# Patient Record
Sex: FEMALE | Race: WHITE
[De-identification: names, ages, dates, MRNs, and addresses within clinical notes are randomized per-mention and may not be internally consistent; named-entity substitution may affect disease eponyms.]

---

## 2017-07-01 ENCOUNTER — HOSPITAL ENCOUNTER (EMERGENCY)
Dept: HOSPITAL 31 - C.ER | Age: 22
Discharge: HOME | End: 2017-07-01
Payer: COMMERCIAL

## 2017-07-01 VITALS
RESPIRATION RATE: 18 BRPM | TEMPERATURE: 99.1 F | HEART RATE: 104 BPM | DIASTOLIC BLOOD PRESSURE: 85 MMHG | SYSTOLIC BLOOD PRESSURE: 117 MMHG

## 2017-07-01 DIAGNOSIS — F41.9: Primary | ICD-10-CM

## 2017-07-01 NOTE — C.PDOC
History Of Present Illness


21 year old female presents to the ED with complaints of anxiety and feeling 

"hot" after having 1 gram of Yara the night before and another 1 gram the 

night prior to that. Patient states she has taken more of the substance in the 

last two days then she usually does. She notes a history of anxiety and has 

seen a psychiatrist but is not taking any medications. She denies any vomiting, 

diarrhea, or headache. 


Time Seen by Provider: 17 02:29


Chief Complaint (Nursing): Anxiety


History Per: Patient


History/Exam Limitations: no limitations


Onset/Duration Of Symptoms: Hrs


Current Symptoms Are (Timing): Still Present


Suicide/Self Injury Attempted (Context): Cut Wrists


Modifying Factor(s): Other (yara )


Associated Symptoms: Anxiety


Involuntary Hold By: None


Recent travel outside of the [place]: No





Past Medical History


Reviewed: Historical Data, Nursing Documentation, Vital Signs


Vital Signs: 


 Last Vital Signs











Temp  99.1 F   17 04:29


 


Pulse  104 H  17 04:29


 


Resp  18   17 04:29


 


BP  117/85   17 04:29


 


Pulse Ox  92 L  17 06:06














- Medical History


PMH: Anemia, Anxiety, Bipolar Disorder, Depression, Schizophrenia, Seizures


Surgical History:  (x1)





- [facility] Procedures








INDIVID PSYCHOTHERAP NEC (14)


OTHER GROUP THERAPY (14)








Family History: States: Unknown Family Hx





- Social History


Hx Tobacco Use: No


Hx Alcohol Use: No (DENIED)


Hx Substance Use: Yes (YARA, MARIJUANA)





- Immunization History


Hx Tetanus Toxoid Vaccination: No


Hx Influenza Vaccination: No


Hx Pneumococcal Vaccination: No





Review Of Systems


Constitutional: Negative for: Fever, Chills


Cardiovascular: Negative for: Chest Pain, Palpitations


Respiratory: Negative for: Cough, Shortness of Breath


Gastrointestinal: Negative for: Nausea, Vomiting, Abdominal Pain, Diarrhea


Psych: Positive for: Anxiety.  Negative for: Suicidal ideation





Physical Exam





- Physical Exam


Appears: Non-toxic, No Acute Distress, Other (Patient is anxious and tremulous 

on exam. )


Skin: Warm, Dry, No Rash


Head: Atraumatic, Normacephalic


Eye(s): bilateral: Normal Inspection, PERRL, EOMI


Oral Mucosa: Moist


Neck: Normal ROM, Supple


Chest: Symmetrical, No Deformity


Cardiovascular: Rhythm Regular, No Friction Rub, No Murmur


Respiratory: Normal Breath Sounds, No Rales, No Rhonchi, No Stridor, No Wheezing


Gastrointestinal/Abdominal: Soft, No Tenderness, No Distention, No Guarding, No 

Rebound


Back: No CVA Tenderness, No Vertebral Tenderness, No Paraspinal Tenderness


Extremity: Normal ROM, No Tenderness


Neurological/Psych: Oriented x3, Normal Speech, Normal Cranial Nerves, Normal 

Motor, Normal Sensation


Gait: Steady





ED Course And Treatment


O2 Sat by Pulse Oximetry: 98 (room air)


Pulse Ox Interpretation: Normal





Medical Decision Making


Medical Decision Making: 


Xanax given. On re-exam, the patient reports improvement of symptoms. Lungs are 

CTA, heart is RRR, abdomen is soft, non-tender and patient is tolerating PO 

well. Ambulatory in the ED with steady gait. Follow up with the medical doctor 

within 1-2 days. Return if worsened. 





Disposition





- Disposition


Referrals: 


[facility] [Outside]


Disposition: HOME/ ROUTINE


Disposition Time: 04:25


Condition: GOOD


Additional Instructions: 


Follow up with the medical doctor within 1-2 days. Return if worsened. 


Instructions:  Anxiety (ED)





- Clinical Impression


Clinical Impression: 


 Anxiety








- Scribe Statement


The provider has reviewed the documentation as recorded by the Scribe





Kalyani Gregory





All medical record entries made by the Scribe were at my direction and 

personally dictated by me. I have reviewed the chart and agree that the record 

accurately reflects my personal performance of the history, physical exam, 

medical decision making, and the department course for this patient. I have 

also personally directed, reviewed, and agree with the discharge instructions 

and disposition.

## 2017-07-03 VITALS — OXYGEN SATURATION: 98 %

## 2017-08-16 ENCOUNTER — HOSPITAL ENCOUNTER (EMERGENCY)
Dept: HOSPITAL 31 - C.ER | Age: 22
Discharge: HOME | End: 2017-08-16
Payer: MEDICAID

## 2017-08-16 VITALS — OXYGEN SATURATION: 99 %

## 2017-08-16 VITALS
HEART RATE: 94 BPM | SYSTOLIC BLOOD PRESSURE: 119 MMHG | RESPIRATION RATE: 18 BRPM | TEMPERATURE: 98.6 F | DIASTOLIC BLOOD PRESSURE: 78 MMHG

## 2017-08-16 DIAGNOSIS — N39.0: Primary | ICD-10-CM

## 2017-08-16 LAB
BILIRUB UR-MCNC: NEGATIVE MG/DL
GLUCOSE UR STRIP-MCNC: NORMAL MG/DL
KETONES UR STRIP-MCNC: NEGATIVE MG/DL
LEUKOCYTE ESTERASE UR-ACNC: (no result) LEU/UL
PH UR STRIP: 7 [PH] (ref 5–8)
PROT UR STRIP-MCNC: (no result) MG/DL
RBC # UR STRIP: NEGATIVE /UL
RBC #/AREA URNS HPF: 5 /HPF (ref 0–3)
SP GR UR STRIP: 1.02 (ref 1–1.03)
UROBILINOGEN UR-MCNC: NORMAL MG/DL (ref 0.2–1)
WBC #/AREA URNS HPF: 11 /HPF (ref 0–5)

## 2017-08-16 NOTE — C.PDOC
History Of Present Illness





21 year old female presents to the ED with complaints of right sided flank pain 

beginning yesterday. Patient is unable to describe pain but "feels like kidney 

pain." She has a history of UTIs in the past. Patient notes nausea but denies 

vomiting, dysuria, or urinary frequency. 


Time Seen by Provider: 17 15:15


Chief Complaint (Nursing): Back Pain


History Per: Patient


History/Exam Limitations: no limitations


Onset/Duration Of Symptoms: Days (symptoms began yesterday )


Current Symptoms Are (Timing): Still Present


Quality Of Discomfort: Unable To Describe


Previous Symptoms: None


Associated Symptoms: Other (nausea )


Recent travel outside of the [place]: No





Past Medical History


Reviewed: Historical Data, Nursing Documentation, Vital Signs


Vital Signs: 


 Last Vital Signs











Temp  98.8 F   17 15:05


 


Pulse  100 H  17 15:05


 


Resp  20   17 15:05


 


BP  130/89   17 15:05


 


Pulse Ox  99   17 15:50














- Medical History


PMH: Anemia, Anxiety, Bipolar Disorder, Depression, Schizophrenia, Seizures


Surgical History:  (x1)





- [facility] Procedures








INDIVID PSYCHOTHERAP NEC (14)


OTHER GROUP THERAPY (14)








Family History: States: Unknown Family Hx





- Social History


Hx Tobacco Use: No


Hx Alcohol Use: No (DENIED)


Hx Substance Use: Yes (KEISHA, MARIJUANA)





- Immunization History


Hx Tetanus Toxoid Vaccination: No


Hx Influenza Vaccination: No


Hx Pneumococcal Vaccination: No





Review Of Systems


Constitutional: Negative for: Fever, Chills


Cardiovascular: Negative for: Chest Pain


Respiratory: Negative for: Shortness of Breath


Gastrointestinal: Positive for: Nausea, Other (right flank pain ).  Negative for

: Vomiting, Abdominal Pain, Diarrhea


Genitourinary: Negative for: Dysuria, Frequency, Vaginal Discharge, Vaginal 

Bleeding


Musculoskeletal: Positive for: Back Pain


Neurological: Negative for: Weakness, Numbness, Headache, Dizziness





Physical Exam





- Physical Exam


Appears: Non-toxic, No Acute Distress


Skin: Warm, Dry


Head: Atraumatic, Normacephalic


Eye(s): bilateral: Normal Inspection, EOMI


Oral Mucosa: Moist


Neck: Normal ROM, Supple


Chest: Symmetrical, No Deformity


Cardiovascular: Rhythm Regular, No Murmur


Respiratory: Normal Breath Sounds, No Rales, No Rhonchi, No Wheezing


Gastrointestinal/Abdominal: Bowel Sounds, Soft, No Tenderness, No Distention, 

No Guarding, No Rebound


Back: Normal Inspection, CVA Tenderness (right sided ), No Paraspinal Tenderness


Extremity: Normal ROM, No Tenderness, No Deformity, No Swelling


Neurological/Psych: Oriented x3, Normal Speech


Gait: Steady





ED Course And Treatment


O2 Sat by Pulse Oximetry: 99 (room air )


Progress Note: A UA was ordered and patient was given Tylenol.





Medical Decision Making


Medical Decision Making: 


Patient with flank pain


UA and preg ordered. UA c/w UTI. Will send C/S


Treated with Tylenol and Cipro


Patient has no fever and is seated in chair in no distress. Advise patient to 

drink fluids and to complete antibiotics. Follow up with PCP. Patient asking 

for work note. 





Disposition


Counseled Patient/Family Regarding: Diagnosis, Need For Followup, Rx Given





- Disposition


Referrals: 


[facility] [Outside]


Disposition: HOME/ ROUTINE


Disposition Time: 15:50


Condition: STABLE


Additional Instructions: 


Take antibiotic twice daily and be sure to finish taking all of antibiotic. 

Drink plenty of water. If urine culture obtained, can take few days for results

, and will contact to notify if antibiotic is treating UTI well. 


Prescriptions: 


Ciprofloxacin [Cipro] 1 tab PO BID #10 tab


Phenazopyridine [Pyridium] 100 mg PO Q8 #6 tab


Instructions:  Urinary Tract Infection in Women (DC)


Forms:  [facility] (English), Work Excuse





- POA


Present On Arrival: None





- Clinical Impression


Clinical Impression: 


 UTI (urinary tract infection)








- PA / NP / Resident Statement


MD/DO has reviewed & agrees with the documentation as recorded.





- Scribe Statement


The provider has reviewed the documentation as recorded by the Scribthanh Gregory





All medical record entries made by the Amina were at my direction and 

personally dictated by me. I have reviewed the chart and agree that the record 

accurately reflects my personal performance of the history, physical exam, 

medical decision making, and the department course for this patient. I have 

also personally directed, reviewed, and agree with the discharge instructions 

and disposition.

## 2017-08-19 ENCOUNTER — HOSPITAL ENCOUNTER (EMERGENCY)
Dept: HOSPITAL 14 - H.ER | Age: 22
Discharge: HOME | End: 2017-08-19
Payer: COMMERCIAL

## 2017-08-19 VITALS
HEART RATE: 93 BPM | SYSTOLIC BLOOD PRESSURE: 133 MMHG | DIASTOLIC BLOOD PRESSURE: 82 MMHG | TEMPERATURE: 98.8 F | OXYGEN SATURATION: 100 % | RESPIRATION RATE: 20 BRPM

## 2017-08-19 DIAGNOSIS — F31.9: ICD-10-CM

## 2017-08-19 DIAGNOSIS — F20.9: ICD-10-CM

## 2017-08-19 DIAGNOSIS — N39.0: Primary | ICD-10-CM

## 2017-08-19 DIAGNOSIS — F41.9: ICD-10-CM

## 2017-08-19 LAB
BASOPHILS # BLD AUTO: 0.1 K/UL (ref 0–0.2)
BASOPHILS NFR BLD: 1 % (ref 0–2)
BUN SERPL-MCNC: 13 MG/DL (ref 7–17)
CALCIUM SERPL-MCNC: 9.2 MG/DL (ref 8.4–10.2)
CHLORIDE SERPL-SCNC: 106 MMOL/L (ref 98–107)
CO2 SERPL-SCNC: 23 MMOL/L (ref 22–30)
EOSINOPHIL # BLD AUTO: 0.1 K/UL (ref 0–0.7)
EOSINOPHIL NFR BLD: 1.1 % (ref 0–4)
ERYTHROCYTE [DISTWIDTH] IN BLOOD BY AUTOMATED COUNT: 13.4 % (ref 11.5–14.5)
GLUCOSE SERPL-MCNC: 90 MG/DL (ref 65–105)
HCT VFR BLD CALC: 36.8 % (ref 34–47)
LYMPHOCYTES # BLD AUTO: 1.7 K/UL (ref 1–4.3)
LYMPHOCYTES NFR BLD AUTO: 24.2 % (ref 20–40)
MCH RBC QN AUTO: 28.7 PG (ref 27–31)
MCHC RBC AUTO-ENTMCNC: 33.9 G/DL (ref 33–37)
MCV RBC AUTO: 84.8 FL (ref 81–99)
MONOCYTES # BLD: 0.7 K/UL (ref 0–0.8)
MONOCYTES NFR BLD: 9 % (ref 0–10)
NEUTROPHILS # BLD: 4.7 K/UL (ref 1.8–7)
NEUTROPHILS NFR BLD AUTO: 64.7 % (ref 50–75)
NRBC BLD AUTO-RTO: 0.1 % (ref 0–0)
PLATELET # BLD: 264 K/UL (ref 130–400)
PMV BLD AUTO: 7.4 FL (ref 7.2–11.7)
POTASSIUM SERPL-SCNC: 3.9 MMOL/L (ref 3.6–5)
SODIUM SERPL-SCNC: 140 MMOL/L (ref 132–148)
WBC # BLD AUTO: 7.2 K/UL (ref 4.8–10.8)

## 2017-08-19 PROCEDURE — 99284 EMERGENCY DEPT VISIT MOD MDM: CPT

## 2017-08-19 PROCEDURE — 87086 URINE CULTURE/COLONY COUNT: CPT

## 2017-08-19 PROCEDURE — 81025 URINE PREGNANCY TEST: CPT

## 2017-08-19 PROCEDURE — 87040 BLOOD CULTURE FOR BACTERIA: CPT

## 2017-08-19 PROCEDURE — 96375 TX/PRO/DX INJ NEW DRUG ADDON: CPT

## 2017-08-19 PROCEDURE — 85025 COMPLETE CBC W/AUTO DIFF WBC: CPT

## 2017-08-19 PROCEDURE — 80048 BASIC METABOLIC PNL TOTAL CA: CPT

## 2017-08-19 PROCEDURE — 74176 CT ABD & PELVIS W/O CONTRAST: CPT

## 2017-08-19 PROCEDURE — 96365 THER/PROPH/DIAG IV INF INIT: CPT

## 2017-08-19 NOTE — CT
EXAM:

  CT Abdomen and Pelvis Without Intravenous Contrast



EXAM DATE/TIME:

  8/19/2017 4:51 PM



CLINICAL HISTORY:

  21 years old, female; Pain; Abdominal pain; Additional info: Roght flank pain



TECHNIQUE:

  Axial computed tomography images of the abdomen and pelvis without 

intravenous contrast.  All CT scans at this facility use one or more dose 

reduction techniques, viz.: automated exposure control; ma/kV adjustment per 

patient size (including targeted exams where dose is matched to indication; 

i.e. head); or iterative reconstruction technique.

  Coronal and sagittal reformatted images were created and reviewed.



COMPARISON:

  No relevant prior studies available.



FINDINGS:

  LOWER THORAX:  No infiltrate seen in the lung bases.



 ABDOMEN:

  LIVER:  No acute abnormality of the liver identified.

  GALLBLADDER AND BILE DUCTS:  No CT evidence of acute cholecystitis.  No 

evidence of significant biliary ductal dilatation.

  PANCREAS:  No CT evidence of acute pancreatitis.

  SPLEEN:  No acute abnormality of the spleen identified.

  ADRENALS:  No acute abnormality of the adrenal glands identified.

  KIDNEYS AND URETERS:  Faint areas of hyperdensity seen in the kidneys 

bilaterally, at the corticomedullary junction regions, most likely due to 

artifact from concentrated urine versus faint medullary nephrocalcinosis.  No 

renal stones, hydronephrosis, or hydroureter seen.

  STOMACH AND BOWEL:  Retained stool noted throughout the colon.  Bowel is 

otherwise unremarkable in appearance.  No evidence of bowel obstruction.

  APPENDIX:  Normal appendix is not seen, however, there are no significant 

inflammatory changes visualized in the expected location of the appendix to 

suggest appendicitis. Recommend clinical correlation.



 PELVIS:

  BLADDER:  No acute abnormality of the bladder identified.

  REPRODUCTIVE:No acute abnormality of the reproductive organs is seen.  No 

acute abnormality of the uterus identified.  No evidence of large adnexal 

masses.



 ABDOMEN and PELVIS:

  INTRAPERITONEAL SPACE:  Small amount of free fluid in the cul-de-sac. This is 

most likely physiologic in nature. No evidence of free intraperitoneal air.

  BONES/JOINTS:  Mild scoliotic curvature of the spine, convex to the left.  No 

acute fractures or other acute bony abnormality visualized.

  SOFT TISSUES:  No acute abnormality of the visualized soft tissues is seen.

  VASCULATURE:  No evidence of abdominal aortic aneurysm. No evidence of 

periaortic hemorrhage.

  LYMPH NODES:  No evidence of diffuse lymphadenopathy.



IMPRESSION:     

- No evidence of significant acute process on this unenhanced exam. No definite 

cause for pain identified.

- See above for remaining findings.

## 2017-08-19 NOTE — ED PDOC
HPI: Abdomen


Time Seen by Provider: 17 16:36


Chief Complaint (Nursing): Back Pain


Chief Complaint (Provider): Right flank pain and suprapubic pain


History Per: Patient


History/Exam Limitations: no limitations


Onset/Duration Of Symptoms: Days (x3)


Associated Symptoms: Vomiting (x4)


Additional Complaint(s): 





Patient is a 21 year old female with a past medical history of a urinary tract 

infection, , depression, and substance abuse, presenting to the 

emergency department for constant right flank pain and suprapubic pain ongoing 

for 3 days with associated 4 episodes of emesis and chills today. Reports that 

she was at [facility] three days ago and was diagnosed with a UTI and 

given Cipro. Although compliant with medication, reports no relief of symptoms. 

Denies fever, diarrhea, hematuria, dysuria, or current drug use.





PCP: Dr. Carmine Antony





Past Medical History


Reviewed: Historical Data, Nursing Documentation, Vital Signs


Vital Signs: 


 Last Vital Signs











Temp  98.8 F   17 16:03


 


Pulse  93 H  17 16:03


 


Resp  20   17 16:03


 


BP  133/82   17 16:03


 


Pulse Ox  100   17 20:40














- Medical History


PMH: Anemia, Anxiety, Bipolar Disorder, Depression, Schizophrenia, Seizures


   Denies: HTN, Chronic Kidney Disease, Sexually Transmitted Disease


Other PMH: Urinary Tract Infection





- Surgical History


Surgical History:  (x1)





- Family History


Family History: States: Unknown Family Hx





- Social History


Current smoker - smoking cessation education provided: No


Ex-Smoker (has not smoked in the last 12 months): No


Alcohol: None


Drugs: Cannabis (Marijuana), Other (Clotilde)





- Immunization History


Hx Tetanus Toxoid Vaccination: No


Hx Influenza Vaccination: No


Hx Pneumococcal Vaccination: No





- Home Medications


Home Medications: 


 Ambulatory Orders











 Medication  Instructions  Recorded


 


Ciprofloxacin [Cipro] 1 tab PO BID #10 tab 17


 


Ondansetron ODT [Zofran ODT] 1 odt PO BID PRN #6 odt 17


 


Phenazopyridine [Pyridium] 100 mg PO Q8 #6 tab 17


 


Ondansetron ODT [Zofran ODT] 4 mg PO Q8 PRN #12 odt 17


 


Sulfamethoxazole/Trimethoprim 1 tab PO BID #20 tab 17





[Bactrim  mg-160 mg]  














- Allergies


Allergies/Adverse Reactions: 


 Allergies











Allergy/AdvReac Type Severity Reaction Status Date / Time


 


chocolate flavor Allergy Severe ANAPHYLAXIS Verified 17 16:02


 


Iodinated Contrast- Oral and Allergy Severe RASH Verified 17 16:02





IV Dye     





[Iodinated Contrast Media -     





IV Dye]     


 


peanut Allergy Severe ANAPHYLAXIS Verified 17 16:02


 


iodine Allergy  ITCHING Verified 17 16:02














Review of Systems


ROS Statement: Except As Marked, All Systems Reviewed And Found Negative


Constitutional: Positive for: Chills.  Negative for: Fever


Gastrointestinal: Positive for: Vomiting, Abdominal Pain (suprapubic pain).  

Negative for: Diarrhea


Genitourinary Female: Negative for: Dysuria, Hematuria


Musculoskeletal: Positive for: Back Pain (right flank pain)





Physical Exam





- Reviewed


Nursing Documentation Reviewed: Yes


Vital Signs Reviewed: Yes





- Physical Exam


Appears: Positive for: Well, Non-toxic, No Acute Distress.  Negative for: 

Uncomfortable


Head Exam: Positive for: ATRAUMATIC, NORMAL INSPECTION, NORMOCEPHALIC


Skin: Positive for: Normal Color, Warm, Dry


Eye Exam: Positive for: Normal appearance, EOMI


Neck: Positive for: Normal, Painless ROM, Supple


Cardiovascular/Chest: Positive for: Regular Rate, Rhythm.  Negative for: Murmur


Respiratory: Positive for: Normal Breath Sounds.  Negative for: Accessory 

Muscle Use, Respiratory Distress


Gastrointestinal/Abdominal: Positive for: Tenderness (mild suprapubic tenderness

).  Negative for: Normal Exam


Back: Positive for: R CVA Tenderness.  Negative for: Normal Inspection


Extremity: Positive for: Normal ROM.  Negative for: Pedal Edema


Neurologic/Psych: Positive for: Alert, Oriented





- Laboratory Results


Result Diagrams: 


 17 17:00





 17 17:00





- ECG


O2 Sat by Pulse Oximetry: 100 (RA)


Pulse Ox Interpretation: Normal





- Progress


Re-evaluation Time: 20:35


Condition: Re-examined, Improved





Medical Decision Making


Medical Decision Making: 





Time: 16:51





Initial Impression: Urinary Tract Infection, Renal Colic/Kidney Stones





Initial Plan:


 Abdominal/Pelvis CT Scan w/o PO or IV contrast


 Labs


 ED Urine Dipstick


 ED Urine Pregnancy


 Tylenol 650 mg PO


 Toradol 30 mg IVP


 Normal Saline 1 L


 Zofran 4 mg IV


 Blood Culture 


 Urine Culture


 Reevaluation





16:52


IV Inserted. Patient's condition remains stable.





18:30


A/P CT scan pending. Patient's condition is unchanged.





19:55


A/P CT scan reviewed and findings noted:





 LOWER THORAX:  No infiltrate seen in the lung bases.  


 


  ABDOMEN:  


   LIVER:  No acute abnormality of the liver identified.  


   GALLBLADDER AND BILE DUCTS:  No CT evidence of acute cholecystitis.  No   


 evidence of significant biliary ductal dilatation.  


   PANCREAS:  No CT evidence of acute pancreatitis.  


   SPLEEN:  No acute abnormality of the spleen identified.  


   ADRENALS:  No acute abnormality of the adrenal glands identified.  


   KIDNEYS AND URETERS:  Faint areas of hyperdensity seen in the kidneys   


 bilaterally, at the corticomedullary junction regions, most likely due to   


 artifact from concentrated urine versus faint medullary nephrocalcinosis.  No 

  


 renal stones, hydronephrosis, or hydroureter seen.  


   STOMACH AND BOWEL:  Retained stool noted throughout the colon.  Bowel is   


 otherwise unremarkable in appearance.  No evidence of bowel obstruction.  


   APPENDIX:  Normal appendix is not seen, however, there are no significant   


 inflammatory changes visualized in the expected location of the appendix to   


 suggest appendicitis. Recommend clinical correlation.  


 


  PELVIS:  


   BLADDER:  No acute abnormality of the bladder identified.  


   REPRODUCTIVE:No acute abnormality of the reproductive organs is seen.  No   


 acute abnormality of the uterus identified.  No evidence of large adnexal   


 masses.  


 


  ABDOMEN and PELVIS:  


   INTRAPERITONEAL SPACE:  Small amount of free fluid in the cul-de-sac. This 

is   


 most likely physiologic in nature. No evidence of free intraperitoneal air.  


   BONES/JOINTS:  Mild scoliotic curvature of the spine, convex to the left.  

No   


 acute fractures or other acute bony abnormality visualized.  


   SOFT TISSUES:  No acute abnormality of the visualized soft tissues is seen.  


   VASCULATURE:  No evidence of abdominal aortic aneurysm. No evidence of   


 periaortic hemorrhage.  


   LYMPH NODES:  No evidence of diffuse lymphadenopathy.  


 


 IMPRESSION:       


 - No evidence of significant acute process on this unenhanced exam. No 

definite   


 cause for pain identified.  


 - See above for remaining findings.  





20:35


Upon provider reevaluation patient is feeling better, is medically stable, and 

requires no further treatment in the ED at this time. Patient will be 

discharged with Rx for Zofran and Bactrim. Counseling was provided and all 

questions were answered regarding diagnosis and need for follow up with Dr. Antony and Dr. Eng. There is agreement to discharge plan. Return if symptoms 

persist or worsen.





Clinical Impression: Urinary tract infection and right flank pain





Scribe Attestation:


Documented by Georgie Noel, acting as a scribe for Melissa Joseph MD.





Provider Scribe Attestation:


All medical record entries made by the Scribe were at my direction and 

personally dictated by me. I have reviewed the chart and agree that the record 

accurately reflects my personal performance of the history, physical exam, 

medical decision making, and the department course for this patient. I have 

also personally directed, reviewed, and agree with the discharge instructions 

and disposition.





Disposition





- Clinical Impression


Clinical Impression: 


 UTI (urinary tract infection), Right flank pain








- Patient ED Disposition


Is Patient to be Admitted: No


Doctor Will See Patient In The: Office


Counseled Patient/Family Regarding: Studies Performed, Diagnosis, Need For 

Followup





- Disposition


Referrals: 


Carmine Antony MD [Staff Provider] - 


Laya Eng MD [Medical Doctor] - 


Disposition: Routine/Home


Disposition Time: 20:35


Condition: GOOD


Additional Instructions: 


Take your medications as instructed. Follow up with your PCP in 2-3 days. 


Prescriptions: 


Ondansetron ODT [Zofran ODT] 4 mg PO Q8 PRN #12 odt


 PRN Reason: Nausea/Vomiting


Sulfamethoxazole/Trimethoprim [Bactrim  mg-160 mg] 1 tab PO BID #20 tab


Instructions:  Urinary Tract Infection in Women (DC)

## 2018-02-10 ENCOUNTER — HOSPITAL ENCOUNTER (EMERGENCY)
Dept: HOSPITAL 14 - H.ER | Age: 23
Discharge: HOME | End: 2018-02-10
Payer: COMMERCIAL

## 2018-02-10 VITALS
TEMPERATURE: 98.4 F | RESPIRATION RATE: 18 BRPM | HEART RATE: 72 BPM | SYSTOLIC BLOOD PRESSURE: 128 MMHG | OXYGEN SATURATION: 99 % | DIASTOLIC BLOOD PRESSURE: 70 MMHG

## 2018-02-10 DIAGNOSIS — F31.9: ICD-10-CM

## 2018-02-10 DIAGNOSIS — R10.9: ICD-10-CM

## 2018-02-10 DIAGNOSIS — R55: Primary | ICD-10-CM

## 2018-02-10 DIAGNOSIS — F41.9: ICD-10-CM

## 2018-02-10 DIAGNOSIS — E86.0: ICD-10-CM

## 2018-02-10 DIAGNOSIS — F20.9: ICD-10-CM

## 2018-02-10 LAB
ALBUMIN SERPL-MCNC: 4.5 G/DL (ref 3.5–5)
ALBUMIN/GLOB SERPL: 1.4 {RATIO} (ref 1–2.1)
ALT SERPL-CCNC: 20 U/L (ref 9–52)
AST SERPL-CCNC: 14 U/L (ref 14–36)
BASOPHILS # BLD AUTO: 0 K/UL (ref 0–0.2)
BASOPHILS NFR BLD: 0.6 % (ref 0–2)
BUN SERPL-MCNC: 9 MG/DL (ref 7–17)
CALCIUM SERPL-MCNC: 9.4 MG/DL (ref 8.4–10.2)
EOSINOPHIL # BLD AUTO: 0 K/UL (ref 0–0.7)
EOSINOPHIL NFR BLD: 0.2 % (ref 0–4)
ERYTHROCYTE [DISTWIDTH] IN BLOOD BY AUTOMATED COUNT: 13.7 % (ref 11.5–14.5)
GFR NON-AFRICAN AMERICAN: > 60
HGB BLD-MCNC: 13.4 G/DL (ref 12–16)
LIPASE SERPL-CCNC: 61 U/L (ref 23–300)
LYMPHOCYTE: 8 % (ref 20–50)
LYMPHOCYTES # BLD AUTO: 0.5 K/UL (ref 1–4.3)
LYMPHOCYTES NFR BLD AUTO: 8.6 % (ref 20–40)
MAGNESIUM SERPL-MCNC: 1.9 MG/DL (ref 1.6–2.3)
MCH RBC QN AUTO: 28.9 PG (ref 27–31)
MCHC RBC AUTO-ENTMCNC: 33.9 G/DL (ref 33–37)
MCV RBC AUTO: 85.1 FL (ref 81–99)
MONOCYTE: 10 % (ref 0–10)
MONOCYTES # BLD: 0.7 K/UL (ref 0–0.8)
MONOCYTES NFR BLD: 12.2 % (ref 0–10)
NEUTROPHILS # BLD: 4.7 K/UL (ref 1.8–7)
NEUTROPHILS NFR BLD AUTO: 78.4 % (ref 50–75)
NEUTROPHILS NFR BLD AUTO: 80 % (ref 42–75)
NEUTS BAND NFR BLD: 2 % (ref 0–2)
NRBC BLD AUTO-RTO: 0.2 % (ref 0–0)
PLATELET # BLD EST: NORMAL 10*3/UL
PLATELET # BLD: 203 K/UL (ref 130–400)
PMV BLD AUTO: 7.7 FL (ref 7.2–11.7)
RBC # BLD AUTO: 4.64 MIL/UL (ref 3.8–5.2)
TOTAL CELLS COUNTED BLD: 100
WBC # BLD AUTO: 6 K/UL (ref 4.8–10.8)

## 2018-02-10 PROCEDURE — 86850 RBC ANTIBODY SCREEN: CPT

## 2018-02-10 PROCEDURE — 85025 COMPLETE CBC W/AUTO DIFF WBC: CPT

## 2018-02-10 PROCEDURE — 84100 ASSAY OF PHOSPHORUS: CPT

## 2018-02-10 PROCEDURE — 83735 ASSAY OF MAGNESIUM: CPT

## 2018-02-10 PROCEDURE — 96374 THER/PROPH/DIAG INJ IV PUSH: CPT

## 2018-02-10 PROCEDURE — 81025 URINE PREGNANCY TEST: CPT

## 2018-02-10 PROCEDURE — 76700 US EXAM ABDOM COMPLETE: CPT

## 2018-02-10 PROCEDURE — 93005 ELECTROCARDIOGRAM TRACING: CPT

## 2018-02-10 PROCEDURE — 99284 EMERGENCY DEPT VISIT MOD MDM: CPT

## 2018-02-10 PROCEDURE — 86900 BLOOD TYPING SEROLOGIC ABO: CPT

## 2018-02-10 PROCEDURE — 83690 ASSAY OF LIPASE: CPT

## 2018-02-10 PROCEDURE — 82948 REAGENT STRIP/BLOOD GLUCOSE: CPT

## 2018-02-10 PROCEDURE — 80053 COMPREHEN METABOLIC PANEL: CPT

## 2018-02-10 NOTE — ED PDOC
Syncope/Near Syncope/Dizziness


Time Seen by Provider: 02/10/18 15:34


Chief Complaint (Nursing): Syncope


Chief Complaint (Provider): Syncope


History Per: Patient


History/Exam Limitations: no limitations


Onset/Duration Of Symptoms: Days (x1)


Additional Complaint(s): 


22 year old female with a past medical history of bipolar disorder reports to 

the emergency room complaining of a syncopal episode occurring prior to arrival 

upon leaving the doctors office. Patient states that upon leaving, she felt 

light headed and blacked out for less than a minute. Denies any tongue biting, 

urine incontinence, or compulsive activity. Also felt as though she was going 

to black out earlier today, but did not. Patient has had syncopal episodes in 

the past. After workup with a neurologist, no cause found for syncope, and was 

pregnant at the time. Also reports abdominal pain, loss of appetite, and nausea 

since Wednesday worsening since onset.





PMD: Dr. Melanie Garza








Past Medical History


Reviewed: Historical Data, Nursing Documentation, Vital Signs


Vital Signs: 





 Last Vital Signs











Temp  98.1 F   02/10/18 14:54


 


Pulse  91 H  02/10/18 14:54


 


Resp  16   02/10/18 14:54


 


BP  122/86   02/10/18 14:54


 


Pulse Ox  100   02/10/18 14:54














- Medical History


PMH: Anemia, Anxiety, Bipolar Disorder, Depression, Schizophrenia, Seizures


   Denies: HTN, Chronic Kidney Disease, Sexually Transmitted Disease





- Surgical History


Surgical History:  (x1)





- Family History


Family History: States: Unknown Family Hx





- Social History


Current smoker - smoking cessation education provided: No


Alcohol: Occasional


Drugs: Cannabis (Regularly)





- Immunization History


Hx Tetanus Toxoid Vaccination: No


Hx Influenza Vaccination: No


Hx Pneumococcal Vaccination: No





- Home Medications


Home Medications: 


 Ambulatory Orders











 Medication  Instructions  Recorded


 


Ciprofloxacin [Cipro] 1 tab PO BID #10 tab 17


 


Ondansetron ODT [Zofran ODT] 1 odt PO BID PRN #6 odt 17


 


Phenazopyridine [Pyridium] 100 mg PO Q8 #6 tab 17


 


Ondansetron ODT [Zofran ODT] 4 mg PO Q8 PRN #12 odt 17


 


Sulfamethoxazole/Trimethoprim 1 tab PO BID #20 tab 17





[Bactrim  mg-160 mg]  


 


Dicyclomine [Bentyl] 20 mg PO BID PRN #30 tab 02/10/18














- Allergies


Allergies/Adverse Reactions: 


 Allergies











Allergy/AdvReac Type Severity Reaction Status Date / Time


 


chocolate flavor Allergy Severe ANAPHYLAXIS Verified 02/10/18 14:54


 


Iodinated Contrast- Oral and Allergy Severe RASH Verified 02/10/18 14:54





IV Dye     





[Iodinated Contrast Media -     





IV Dye]     


 


peanut Allergy Severe ANAPHYLAXIS Verified 02/10/18 14:54


 


iodine Allergy  ITCHING Verified 02/10/18 14:54














Review of Systems


ROS Statement: Except As Marked, All Systems Reviewed And Found Negative


Constitutional: Positive for: Other (Muscles aches to the neck and right thigh)

.  Negative for: Fever


ENT: Negative for: Nose Discharge (rhinorrhea)


Cardiovascular: Negative for: Chest Pain


Respiratory: Negative for: Cough, Shortness of Breath


Gastrointestinal: Positive for: Nausea, Abdominal Pain (constant bilateral 

upper abdominal pain, suprapubic).  Negative for: Vomiting, Diarrhea, 

Constipation


Neurological: Positive for: Headache, Other (Syncope)





Physical Exam





- Reviewed


Nursing Documentation Reviewed: Yes


Vital Signs Reviewed: Yes





- Physical Exam


Appears: Positive for: Non-toxic, No Acute Distress


Head Exam: Positive for: ATRAUMATIC, NORMOCEPHALIC


Skin: Positive for: Warm, Dry


Eye Exam: Positive for: EOMI, PERRL


ENT: Negative for: Pharyngeal Erythema, Tonsillar Exudate


Neck: Positive for: Painless ROM, Supple


Cardiovascular/Chest: Positive for: Regular Rate, Rhythm, Chest Non Tender.  

Negative for: Murmur


Respiratory: Positive for: Normal Breath Sounds.  Negative for: Wheezing


Gastrointestinal/Abdominal: Positive for: Soft, Tenderness (diffuse mild).  

Negative for: Mass, Distended, Guarding, Rebound


Back: Positive for: Normal Inspection.  Negative for: Decreased ROM


Extremity: Positive for: Normal ROM.  Negative for: Deformity


Lymphatic: Negative for: Adenopathy


Neurologic/Psych: Positive for: Alert.  Negative for: Motor/Sensory Deficits





- Laboratory Results


Result Diagrams: 


 02/10/18 16:10





 02/10/18 16:10





- ECG


O2 Sat by Pulse Oximetry: 100 (RA)


Pulse Ox Interpretation: Normal





Medical Decision Making


Medical Decision Making: 


Initial Impression: Syncope and abdominal pain





Time: 15:45


Initial Plan:


--Type and Screen


--EKG


--CMP


--Drug Screen Urine


--Lipase


--Magnesium


--Phosphorous


--ED Urine Pregnancy Test


--ED Urine Dipstick


--EKG-ED


--CBC with Differential


--Dextrose 5% Lactated Ringers 1000 mL  mls/hr


--Sodium Chloride 0.9% 1000 mL IV 1000 mls/hr


--Zofran Inj 4mg IVP once one


--IV Insertion


--US of Abdomen





Differential Includes but is not Limited To:


Dehydration, electrolyte abnormality, pancreatitis, pyelonephritis, UTI, 

vasovagal syncope.


 


Accession No. : [id number]


Patient Name / ID : RINKU OROURKE  / [id number]


Exam Date : 02/10/2018 16:18:23 ( Approved )


Study Comment : 


Sex / Age : F  / 22Y





Creator : Syed Carvalho MD


Dictator : Syed Carvalho MD


 : 


Approver : Syed Carvalho MD


Approver2 : 





Report Date : 02/10/2018 17:09:29


My Comment : 


********************************************************************************

***





HISTORY:


abdominal pain room 





COMPARISON:


None.





TECHNIQUE:


Sonographic evaluation of the abdomen.





FINDINGS:





LIVER:


Measures 17.0 cm.  Patent portal vein. Portal venous flow: Hepatopetal. 

Unremarkeable echogenicity of the liver parenchyma. No mass. No intrahepatic 

bile duct dilatation.





GALLBLADDER:


Unremarkable. No gallstones.





COMMON BILE DUCT:


Measures 2.3 mm. No stones. No dilatation.





PANCREAS:


Unremarkable as visualized. No mass. No ductal dilatation.





RIGHT KIDNEY:


Measures 5.5 x 13.7cm. Normal echogenicity. No calculus, mass, or 

hydronephrosis.





LEFT KIDNEY:


Measures 5.5 x 13cm. Normal echogenicity. No calculus, mass, or hydronephrosis.





SPLEEN:


Normal in size and contour. No mass.





AORTA:


No aneurysmal dilatation. 





IVC:


Unremarkable. 





OTHER FINDINGS:


None. 





IMPRESSION:


No significant or acute  findings to account for/ related to the clinical 

presentation.


 Labs unremarkable.


DW pt findings and plan of care.











--------------------------------------------------------------------------------

-----------------





Scribe Attestation: 


Documented by Deja Gustafson, acting as a scribe for Akosua Buck MD 


Provider Scribe Attestation:


All medical record entries made by the Scribe were at my direction and 

personally dictated by me. I have reviewed the chart and agree that the record 

accurately reflects my personal performance of the history, physical exam, 

medical decision making, and the department course for this patient. I have 

also personally directed, reviewed, and agree with the discharge instructions 

and disposition.





Disposition





- Clinical Impression


Clinical Impression: 


 Syncope, Abdominal pain








- Disposition


Referrals: 


Carmine Garza MD [Family Provider] -  (FOLLOW UP WITH DR GARZA NEXT WEEK FOR 

REFERRAL TO GASTROENTROLOGIST FOR FURTHER EVALUATION)


Disposition: Routine/Home


Disposition Time: 18:28


Condition: STABLE


Prescriptions: 


Dicyclomine [Bentyl] 20 mg PO BID PRN #30 tab


 PRN Reason: abdominal pain


Instructions:  Abdominal Pain (ED)

## 2018-02-10 NOTE — US
HISTORY:

abdominal pain room 



COMPARISON:

None.



TECHNIQUE:

Sonographic evaluation of the abdomen.



FINDINGS:



LIVER:

Measures 17.0 cm.  Patent portal vein. Portal venous flow: 

Hepatopetal. Unremarkeable echogenicity of the liver parenchyma. No 

mass. No intrahepatic bile duct dilatation.



GALLBLADDER:

Unremarkable. No gallstones.



COMMON BILE DUCT:

Measures 2.3 mm. No stones. No dilatation.



PANCREAS:

Unremarkable as visualized. No mass. No ductal dilatation.



RIGHT KIDNEY:

Measures 5.5 x 13.7cm. Normal echogenicity. No calculus, mass, or 

hydronephrosis.



LEFT KIDNEY:

Measures 5.5 x 13cm. Normal echogenicity. No calculus, mass, or 

hydronephrosis.



SPLEEN:

Normal in size and contour. No mass.



AORTA:

No aneurysmal dilatation. 



IVC:

Unremarkable. 



OTHER FINDINGS:

None. 



IMPRESSION:

No significant or acute  findings to account for/ related to the 

clinical presentation.

## 2018-02-11 NOTE — CARD
--------------- APPROVED REPORT --------------





EKG Measurement

Heart [id number]

NC 170P44

[id number]

[id number]

[id number]



<Conclusion>

Normal sinus rhythm with sinus arrhythmia

Normal ECG

## 2018-10-08 ENCOUNTER — HOSPITAL ENCOUNTER (EMERGENCY)
Dept: HOSPITAL 14 - H.ER | Age: 23
LOS: 1 days | Discharge: HOME | End: 2018-10-09
Payer: COMMERCIAL

## 2018-10-08 VITALS — RESPIRATION RATE: 18 BRPM

## 2018-10-08 DIAGNOSIS — O21.9: Primary | ICD-10-CM

## 2018-10-08 DIAGNOSIS — F31.9: ICD-10-CM

## 2018-10-08 LAB
BACTERIA #/AREA URNS HPF: (no result) /[HPF]
BASOPHILS # BLD AUTO: 0.1 K/UL (ref 0–0.2)
BASOPHILS NFR BLD: 0.5 % (ref 0–2)
BILIRUB UR-MCNC: NEGATIVE MG/DL
COLOR UR: YELLOW
EOSINOPHIL # BLD AUTO: 0.1 K/UL (ref 0–0.7)
EOSINOPHIL NFR BLD: 1.1 % (ref 0–4)
ERYTHROCYTE [DISTWIDTH] IN BLOOD BY AUTOMATED COUNT: 13 % (ref 11.5–14.5)
GLUCOSE UR STRIP-MCNC: (no result) MG/DL
HGB BLD-MCNC: 12.8 G/DL (ref 12–16)
LEUKOCYTE ESTERASE UR-ACNC: (no result) LEU/UL
LYMPHOCYTES # BLD AUTO: 2.5 K/UL (ref 1–4.3)
LYMPHOCYTES NFR BLD AUTO: 24.9 % (ref 20–40)
MCH RBC QN AUTO: 28.9 PG (ref 27–31)
MCHC RBC AUTO-ENTMCNC: 34.1 G/DL (ref 33–37)
MCV RBC AUTO: 84.7 FL (ref 81–99)
MONOCYTES # BLD: 0.9 K/UL (ref 0–0.8)
MONOCYTES NFR BLD: 9.3 % (ref 0–10)
NEUTROPHILS # BLD: 6.5 K/UL (ref 1.8–7)
NEUTROPHILS NFR BLD AUTO: 64.2 % (ref 50–75)
NRBC BLD AUTO-RTO: 0 % (ref 0–0)
PH UR STRIP: 7 [PH] (ref 5–8)
PLATELET # BLD: 245 K/UL (ref 130–400)
PMV BLD AUTO: 7.9 FL (ref 7.2–11.7)
PROT UR STRIP-MCNC: NEGATIVE MG/DL
RBC # BLD AUTO: 4.44 MIL/UL (ref 3.8–5.2)
RBC # UR STRIP: NEGATIVE /UL
SP GR UR STRIP: 1.01 (ref 1–1.03)
SQUAMOUS EPITHIAL: 1 /HPF (ref 0–5)
URINE CLARITY: CLEAR
UROBILINOGEN UR-MCNC: (no result) MG/DL (ref 0.2–1)
WBC # BLD AUTO: 10.1 K/UL (ref 4.8–10.8)

## 2018-10-08 NOTE — ED PDOC
HPI: Abdomen


Time Seen by Provider: 10/08/18 22:16


Chief Complaint (Nursing): Female Genitourinary


Chief Complaint (Provider): Female Genitourinary


History Per: Patient


History/Exam Limitations: no limitations


Onset/Duration Of Symptoms: Days (x2)


Current Symptoms Are (Timing): Constant


Quality Of Discomfort: "Pain"


Associated Symptoms: Vomiting.  denies: Fever, Chills, Nausea, Diarrhea, Chest 

Pain, Urinary Symptoms


Additional Complaint(s): 





23 year old female (A0) presents to the hospital for sharp abdominal pain 

onset x2 days. Patient reports she was vomiting for a week, but resolved x2 days

ago. She denies fever, chest pain, urinary symptoms, diarrhea, or any other 

medical complaints. Patient states she is anxious and wants to ensure her baby 

is okay. No further complaints.





PMD: Dr. Arpan BAKER: Dr. Tejeda





Past Medical History


Reviewed: Historical Data, Nursing Documentation, Vital Signs


Vital Signs: 





                                Last Vital Signs











Temp  98.5 F   10/08/18 22:04


 


Pulse  84   10/08/18 22:04


 


Resp  18   10/08/18 22:04


 


BP  122/90   10/08/18 22:04


 


Pulse Ox  99   10/08/18 22:04














- Medical History


PMH: Anemia, Anxiety, Bipolar Disorder, Depression, Schizophrenia, Seizures


   Denies: HTN, Chronic Kidney Disease, Sexually Transmitted Disease





- Surgical History


Surgical History:  (x1)





- Family History


Family History: States: Unknown Family Hx





- Social History


Current smoker - smoking cessation education provided: No


Ex-Smoker (has not smoked in the last 12 months): No


Alcohol: None


Drugs: Denies





- Immunization History


Hx Tetanus Toxoid Vaccination: No


Hx Influenza Vaccination: No


Hx Pneumococcal Vaccination: No





- Home Medications


Home Medications: 


                                Ambulatory Orders











 Medication  Instructions  Recorded


 


Ciprofloxacin [Cipro] 1 tab PO BID #10 tab 17


 


Ondansetron ODT [Zofran ODT] 1 odt PO BID PRN #6 odt 17


 


Phenazopyridine [Pyridium] 100 mg PO Q8 #6 tab 17


 


Ondansetron ODT [Zofran ODT] 4 mg PO Q8 PRN #12 odt 17


 


Sulfamethoxazole/Trimethoprim 1 tab PO BID #20 tab 17





[Bactrim  mg-160 mg]  


 


Dicyclomine [Bentyl] 20 mg PO BID PRN #30 tab 02/10/18














- Allergies


Allergies/Adverse Reactions: 


                                    Allergies











Allergy/AdvReac Type Severity Reaction Status Date / Time


 


chocolate flavor Allergy Severe ANAPHYLAXIS Verified 02/10/18 14:54


 


Iodinated Contrast- Oral and Allergy Severe RASH Verified 02/10/18 14:54





IV Dye     





[Iodinated Contrast Media -     





IV Dye]     


 


peanut Allergy Severe ANAPHYLAXIS Verified 02/10/18 14:54


 


iodine Allergy  ITCHING Verified 02/10/18 14:54














Review of Systems


ROS Statement: Except As Marked, All Systems Reviewed And Found Negative


Gastrointestinal: Positive for: Vomiting, Abdominal Pain





Physical Exam





- Reviewed


Nursing Documentation Reviewed: Yes


Vital Signs Reviewed: Yes





- Physical Exam


Appears: Positive for: Non-toxic, No Acute Distress


Skin: Positive for: Normal Color, Warm, Dry


Eye Exam: Positive for: Normal appearance


Cardiovascular/Chest: Positive for: Regular Rate, Rhythm.  Negative for: Murmur


Respiratory: Positive for: Normal Breath Sounds.  Negative for: Respiratory 

Distress


Gastrointestinal/Abdominal: Positive for: Normal Exam, Soft.  Negative for: 

Tenderness


Back: Positive for: Normal Inspection


Extremity: Positive for: Normal ROM.  Negative for: Pedal Edema, Deformity


Neurologic/Psych: Positive for: Alert, Oriented (x3)





- Laboratory Results


Result Diagrams: 


                                 10/08/18 23:00





                                 10/08/18 23:00





- ECG


O2 Sat by Pulse Oximetry: 99 (RA)


Pulse Ox Interpretation: Normal





Medical Decision Making


Medical Decision Making: 





Time: 


Initial Plan:


--Beta HCG


--CMP


--CBC with differentials


--Urine C&C


--Urinalysis


--US OB Pregnancy 








Time: 22:39


Transvaginal US


IMPRESSION:


Single, live intrauterine gestation.


Positive cardiac activity


Small bowel loops are noted in the pelvic area





Time: 3:27


- discharged home, follow up with Dr. Tejeda on Monday


 

--------------------------------------------------------------------------------


-----------------


Scribe Attestation:


Documented by Laurie Durham, acting as a scribe for Christoph Larson MD





Provider Scribe Attestation:


All medical record entries made by the Scribe were at my direction and 

personally dictated by me. I have reviewed the chart and agree that the record 

accurately reflects my personal performance of the history, physical exam, 

medical decision making, and the department course for this patient. I have also

personally directed, reviewed, and agree with the discharge instructions and di

sposition.





Disposition





- Clinical Impression


Clinical Impression: 


 Pregnancy








- Disposition


Referrals: 


[facility] [Outside]


Disposition: Routine/Home


Disposition Time: 03:27


Condition: IMPROVED


Additional Instructions: 


follow up with your primary doctor/gynecologist Dr Tejeda on monday


return to the ED with any worsening or concerning symptoms


Instructions:  Pregnancy Symptoms


Forms:  [facility] (English)

## 2018-10-09 VITALS
HEART RATE: 73 BPM | DIASTOLIC BLOOD PRESSURE: 62 MMHG | OXYGEN SATURATION: 98 % | SYSTOLIC BLOOD PRESSURE: 122 MMHG | TEMPERATURE: 97.9 F

## 2018-10-09 LAB
ALBUMIN SERPL-MCNC: 4.5 G/DL (ref 3.5–5)
ALBUMIN/GLOB SERPL: 1.5 {RATIO} (ref 1–2.1)
ALT SERPL-CCNC: 24 U/L (ref 9–52)
AST SERPL-CCNC: 18 U/L (ref 14–36)
BUN SERPL-MCNC: 9 MG/DL (ref 7–17)
CALCIUM SERPL-MCNC: 9.7 MG/DL (ref 8.4–10.2)
GFR NON-AFRICAN AMERICAN: > 60

## 2018-10-09 NOTE — US
Date of service: 



10/08/2018



PROCEDURE:  First trimester fetal ultrasound



HISTORY:

Pelvic pain 



COMPARISON:

None



TECHNIQUE:

Standard protocol for this study/examination.



FINDINGS:

LMP: Unknown



Prior examinations from the current pregnancy: None.



TECHNIQUE: Real-time 2D imaging, duplex and color Doppler.



FINDINGS:



Cardiac activity: Present



Rate: 138 BPM



Measurements:



Crown rump length: 0.58 cm



Gestational age based on CRL   6 weeks 3 days 



Gestational age 7 weeks 1 day based on gestational sac measurement 

2.45 cm



Gestational age derived from  LMP:   Cannot be ascertained based in 

the absence of a reliable/ known LMP



JOYCE based on LMP: Cannot be ascertained based in the absence of a 

reliable/ known LMP



JOYCE based on biometry: 6 weeks 6 days



Gestational concordance  cannot be determined



Yolk sac  identified 



Cervix: No Cervical abnormalities: Negative examination for cervical 

dilatation or effacement. 



Closed cervix measuring 3.11 cm



Subchorionic hemorrhage: None 



UTERUS: 5.6 x 6.3 x 7.7 cm.



ADNEXA:



Right: 1 x 2.3 x 2.2 cm.   Normal Doppler arterial waveform 

documented.



Left:   1.7 x 2.6 x 2.8 cm.  Normal Doppler arterial waveform 

documented



Fluid in the cul-de-sac: None



IMPRESSION:

Six weeks 6 days live intrauterine gestation.  Absence of reliable 

LMP precludes assessment of concordance.



________________________________________________



Concordant results (preliminary interpretation) provided by [facility].



Procedure Completed: 23:39.



Preliminary Report: Dictated and Authenticated: 00:07.



Final Interpretation: 12:04. October 9, 2018

## 2019-02-14 ENCOUNTER — HOSPITAL ENCOUNTER (EMERGENCY)
Dept: HOSPITAL 14 - H.ER | Age: 24
LOS: 1 days | Discharge: HOME | End: 2019-02-15
Payer: COMMERCIAL

## 2019-02-14 VITALS — TEMPERATURE: 98.1 F | OXYGEN SATURATION: 99 %

## 2019-02-14 DIAGNOSIS — Z3A.25: ICD-10-CM
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
## 2019-02-14 DIAGNOSIS — S61.452A: Primary | ICD-10-CM

## 2019-02-14 DIAGNOSIS — Z88.8: ICD-10-CM
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
## 2019-02-14 DIAGNOSIS — F20.9: ICD-10-CM

## 2019-02-15 VITALS — HEART RATE: 74 BPM | RESPIRATION RATE: 18 BRPM | SYSTOLIC BLOOD PRESSURE: 104 MMHG | DIASTOLIC BLOOD PRESSURE: 75 MMHG
